# Patient Record
Sex: FEMALE | Race: WHITE | NOT HISPANIC OR LATINO
[De-identification: names, ages, dates, MRNs, and addresses within clinical notes are randomized per-mention and may not be internally consistent; named-entity substitution may affect disease eponyms.]

---

## 2018-11-27 PROBLEM — Z00.00 ENCOUNTER FOR PREVENTIVE HEALTH EXAMINATION: Status: ACTIVE | Noted: 2018-11-27

## 2018-12-06 ENCOUNTER — APPOINTMENT (OUTPATIENT)
Dept: OBGYN | Facility: CLINIC | Age: 23
End: 2018-12-06
Payer: COMMERCIAL

## 2018-12-06 PROCEDURE — 99385 PREV VISIT NEW AGE 18-39: CPT

## 2019-03-05 ENCOUNTER — APPOINTMENT (OUTPATIENT)
Dept: OBGYN | Facility: CLINIC | Age: 24
End: 2019-03-05
Payer: COMMERCIAL

## 2019-03-05 PROCEDURE — 99215 OFFICE O/P EST HI 40 MIN: CPT | Mod: 25

## 2019-03-05 PROCEDURE — 76817 TRANSVAGINAL US OBSTETRIC: CPT

## 2019-03-19 ENCOUNTER — APPOINTMENT (OUTPATIENT)
Dept: OBGYN | Facility: CLINIC | Age: 24
End: 2019-03-19
Payer: COMMERCIAL

## 2019-03-19 PROCEDURE — 76801 OB US < 14 WKS SINGLE FETUS: CPT

## 2019-04-04 ENCOUNTER — APPOINTMENT (OUTPATIENT)
Dept: OBGYN | Facility: CLINIC | Age: 24
End: 2019-04-04
Payer: COMMERCIAL

## 2019-04-04 PROCEDURE — 0502F SUBSEQUENT PRENATAL CARE: CPT

## 2019-05-02 ENCOUNTER — APPOINTMENT (OUTPATIENT)
Dept: OBGYN | Facility: CLINIC | Age: 24
End: 2019-05-02
Payer: COMMERCIAL

## 2019-05-02 PROCEDURE — 0502F SUBSEQUENT PRENATAL CARE: CPT

## 2019-06-06 ENCOUNTER — APPOINTMENT (OUTPATIENT)
Dept: OBGYN | Facility: CLINIC | Age: 24
End: 2019-06-06
Payer: COMMERCIAL

## 2019-06-06 PROCEDURE — 0502F SUBSEQUENT PRENATAL CARE: CPT

## 2019-06-21 ENCOUNTER — OUTPATIENT (OUTPATIENT)
Dept: OUTPATIENT SERVICES | Facility: HOSPITAL | Age: 24
LOS: 1 days | Discharge: HOME | End: 2019-06-21

## 2019-06-21 VITALS — DIASTOLIC BLOOD PRESSURE: 61 MMHG | HEART RATE: 88 BPM | SYSTOLIC BLOOD PRESSURE: 108 MMHG | RESPIRATION RATE: 18 BRPM

## 2019-06-21 VITALS — SYSTOLIC BLOOD PRESSURE: 108 MMHG | DIASTOLIC BLOOD PRESSURE: 60 MMHG | HEART RATE: 96 BPM

## 2019-06-21 DIAGNOSIS — R55 SYNCOPE AND COLLAPSE: ICD-10-CM

## 2019-06-21 DIAGNOSIS — O26.892 OTHER SPECIFIED PREGNANCY RELATED CONDITIONS, SECOND TRIMESTER: ICD-10-CM

## 2019-06-21 LAB — GLUCOSE BLDC GLUCOMTR-MCNC: 106 MG/DL — HIGH (ref 70–99)

## 2019-06-21 NOTE — OB PROVIDER TRIAGE NOTE - NSHPPHYSICALEXAM_GEN_ALL_CORE
PHYSICAL EXAM:  -  HR: 88 (06-21 @ 13:21)  BP: 108/61 (06-21 @ 13:21)  RR: 18 (06-21 @ 13:21)  Constitutional: AAOx3, NAD  Abdomen: Soft, gravid, nontender, no palpable ctx  FH ~ 20cms

## 2019-06-21 NOTE — OB PROVIDER TRIAGE NOTE - HISTORY OF PRESENT ILLNESS
25 y/o  @ 21.3 weeks mignon 10/29/19 date by lmp, who presents to l & D [place] in [place] @ 1130 am. PT denies LOC. head trauma or abdominal trauma.  PT states this occurred before and was found to have low sugar.  Pt was recently placed on Z-tk for a cold and did not eat more than a slice of bread at breakfast.  Patient was assessed by EMS told her sugar was low & encouraged to eat a meal.  She feels better now s/p eating & drinking. Pt denies any contractions, lof or vaginal bleeding.  PT states + fetal movement.

## 2019-06-21 NOTE — OB PROVIDER TRIAGE NOTE - NSOBPROVIDERNOTE_OBGYN_ALL_OB_FT
23 y/o  @ 21.3 weeks s/p vasovagal episode earlier, no LOC no obstetrical complaints  encouraged small frequent meals & po hydration  d/c ot home with follow-up  d/w Dr Levine & Dr Oliver  pre term labor precautions given

## 2019-07-08 ENCOUNTER — APPOINTMENT (OUTPATIENT)
Dept: OBGYN | Facility: CLINIC | Age: 24
End: 2019-07-08
Payer: COMMERCIAL

## 2019-07-08 PROCEDURE — 0502F SUBSEQUENT PRENATAL CARE: CPT

## 2019-07-09 PROBLEM — Z78.9 OTHER SPECIFIED HEALTH STATUS: Chronic | Status: ACTIVE | Noted: 2019-06-21

## 2019-08-08 ENCOUNTER — APPOINTMENT (OUTPATIENT)
Dept: OBGYN | Facility: CLINIC | Age: 24
End: 2019-08-08
Payer: COMMERCIAL

## 2019-08-08 PROCEDURE — 0502F SUBSEQUENT PRENATAL CARE: CPT

## 2019-09-05 ENCOUNTER — APPOINTMENT (OUTPATIENT)
Dept: OBGYN | Facility: CLINIC | Age: 24
End: 2019-09-05

## 2019-09-09 ENCOUNTER — APPOINTMENT (OUTPATIENT)
Dept: OBGYN | Facility: CLINIC | Age: 24
End: 2019-09-09
Payer: COMMERCIAL

## 2019-09-09 PROCEDURE — 0502F SUBSEQUENT PRENATAL CARE: CPT

## 2019-09-23 ENCOUNTER — TRANSCRIPTION ENCOUNTER (OUTPATIENT)
Age: 24
End: 2019-09-23

## 2019-09-24 ENCOUNTER — APPOINTMENT (OUTPATIENT)
Dept: OBGYN | Facility: CLINIC | Age: 24
End: 2019-09-24
Payer: COMMERCIAL

## 2019-09-24 PROCEDURE — 0502F SUBSEQUENT PRENATAL CARE: CPT

## 2019-10-07 ENCOUNTER — APPOINTMENT (OUTPATIENT)
Dept: OBGYN | Facility: CLINIC | Age: 24
End: 2019-10-07
Payer: COMMERCIAL

## 2019-10-07 PROCEDURE — 0502F SUBSEQUENT PRENATAL CARE: CPT

## 2019-10-07 PROCEDURE — 59426 ANTEPARTUM CARE ONLY: CPT

## 2019-10-17 ENCOUNTER — APPOINTMENT (OUTPATIENT)
Dept: OBGYN | Facility: CLINIC | Age: 24
End: 2019-10-17
Payer: COMMERCIAL

## 2019-10-17 PROCEDURE — 76816 OB US FOLLOW-UP PER FETUS: CPT

## 2019-10-24 ENCOUNTER — APPOINTMENT (OUTPATIENT)
Dept: OBGYN | Facility: CLINIC | Age: 24
End: 2019-10-24
Payer: COMMERCIAL

## 2019-10-24 PROCEDURE — 0502F SUBSEQUENT PRENATAL CARE: CPT

## 2019-10-27 ENCOUNTER — OUTPATIENT (OUTPATIENT)
Dept: OUTPATIENT SERVICES | Facility: HOSPITAL | Age: 24
LOS: 1 days | Discharge: HOME | End: 2019-10-27
Payer: COMMERCIAL

## 2019-10-27 ENCOUNTER — OUTPATIENT (OUTPATIENT)
Dept: OUTPATIENT SERVICES | Facility: HOSPITAL | Age: 24
LOS: 1 days | Discharge: HOME | End: 2019-10-27

## 2019-10-27 ENCOUNTER — INPATIENT (INPATIENT)
Facility: HOSPITAL | Age: 24
LOS: 2 days | Discharge: HOME | End: 2019-10-30
Attending: OBSTETRICS & GYNECOLOGY | Admitting: OBSTETRICS & GYNECOLOGY
Payer: COMMERCIAL

## 2019-10-27 VITALS
DIASTOLIC BLOOD PRESSURE: 71 MMHG | RESPIRATION RATE: 18 BRPM | HEART RATE: 89 BPM | TEMPERATURE: 98 F | SYSTOLIC BLOOD PRESSURE: 133 MMHG

## 2019-10-27 VITALS
TEMPERATURE: 98 F | SYSTOLIC BLOOD PRESSURE: 122 MMHG | DIASTOLIC BLOOD PRESSURE: 86 MMHG | RESPIRATION RATE: 20 BRPM | HEART RATE: 101 BPM

## 2019-10-27 VITALS
RESPIRATION RATE: 18 BRPM | TEMPERATURE: 98 F | HEART RATE: 110 BPM | DIASTOLIC BLOOD PRESSURE: 86 MMHG | SYSTOLIC BLOOD PRESSURE: 129 MMHG

## 2019-10-27 VITALS — SYSTOLIC BLOOD PRESSURE: 120 MMHG | DIASTOLIC BLOOD PRESSURE: 73 MMHG | HEART RATE: 100 BPM

## 2019-10-27 VITALS — SYSTOLIC BLOOD PRESSURE: 130 MMHG | HEART RATE: 91 BPM | DIASTOLIC BLOOD PRESSURE: 72 MMHG

## 2019-10-27 VITALS — HEART RATE: 93 BPM | SYSTOLIC BLOOD PRESSURE: 109 MMHG | DIASTOLIC BLOOD PRESSURE: 64 MMHG

## 2019-10-27 VITALS — TEMPERATURE: 98 F

## 2019-10-27 LAB
APPEARANCE UR: CLEAR — SIGNIFICANT CHANGE UP
BACTERIA # UR AUTO: ABNORMAL
BASOPHILS # BLD AUTO: 0.03 K/UL — SIGNIFICANT CHANGE UP (ref 0–0.2)
BASOPHILS NFR BLD AUTO: 0.3 % — SIGNIFICANT CHANGE UP (ref 0–1)
BILIRUB UR-MCNC: NEGATIVE — SIGNIFICANT CHANGE UP
BLD GP AB SCN SERPL QL: SIGNIFICANT CHANGE UP
COLOR SPEC: SIGNIFICANT CHANGE UP
DIFF PNL FLD: ABNORMAL
EOSINOPHIL # BLD AUTO: 0.07 K/UL — SIGNIFICANT CHANGE UP (ref 0–0.7)
EOSINOPHIL NFR BLD AUTO: 0.6 % — SIGNIFICANT CHANGE UP (ref 0–8)
EPI CELLS # UR: 9 /HPF — HIGH (ref 0–5)
GLUCOSE UR QL: NEGATIVE — SIGNIFICANT CHANGE UP
HCT VFR BLD CALC: 36.1 % — LOW (ref 37–47)
HGB BLD-MCNC: 12.3 G/DL — SIGNIFICANT CHANGE UP (ref 12–16)
HYALINE CASTS # UR AUTO: 1 /LPF — SIGNIFICANT CHANGE UP (ref 0–7)
IMM GRANULOCYTES NFR BLD AUTO: 0.7 % — HIGH (ref 0.1–0.3)
KETONES UR-MCNC: NEGATIVE — SIGNIFICANT CHANGE UP
LEUKOCYTE ESTERASE UR-ACNC: NEGATIVE — SIGNIFICANT CHANGE UP
LYMPHOCYTES # BLD AUTO: 1.72 K/UL — SIGNIFICANT CHANGE UP (ref 1.2–3.4)
LYMPHOCYTES # BLD AUTO: 14.8 % — LOW (ref 20.5–51.1)
MCHC RBC-ENTMCNC: 28.1 PG — SIGNIFICANT CHANGE UP (ref 27–31)
MCHC RBC-ENTMCNC: 34.1 G/DL — SIGNIFICANT CHANGE UP (ref 32–37)
MCV RBC AUTO: 82.6 FL — SIGNIFICANT CHANGE UP (ref 81–99)
MONOCYTES # BLD AUTO: 0.93 K/UL — HIGH (ref 0.1–0.6)
MONOCYTES NFR BLD AUTO: 8 % — SIGNIFICANT CHANGE UP (ref 1.7–9.3)
NEUTROPHILS # BLD AUTO: 8.79 K/UL — HIGH (ref 1.4–6.5)
NEUTROPHILS NFR BLD AUTO: 75.6 % — HIGH (ref 42.2–75.2)
NITRITE UR-MCNC: NEGATIVE — SIGNIFICANT CHANGE UP
NRBC # BLD: 0 /100 WBCS — SIGNIFICANT CHANGE UP (ref 0–0)
PH UR: 8 — SIGNIFICANT CHANGE UP (ref 5–8)
PLATELET # BLD AUTO: 184 K/UL — SIGNIFICANT CHANGE UP (ref 130–400)
PRENATAL SYPHILIS TEST: SIGNIFICANT CHANGE UP
PROT UR-MCNC: NEGATIVE — SIGNIFICANT CHANGE UP
RBC # BLD: 4.37 M/UL — SIGNIFICANT CHANGE UP (ref 4.2–5.4)
RBC # FLD: 13.8 % — SIGNIFICANT CHANGE UP (ref 11.5–14.5)
RBC CASTS # UR COMP ASSIST: 2 /HPF — SIGNIFICANT CHANGE UP (ref 0–4)
SP GR SPEC: 1.01 — LOW (ref 1.01–1.02)
UROBILINOGEN FLD QL: SIGNIFICANT CHANGE UP
WBC # BLD: 11.62 K/UL — HIGH (ref 4.8–10.8)
WBC # FLD AUTO: 11.62 K/UL — HIGH (ref 4.8–10.8)
WBC UR QL: 2 /HPF — SIGNIFICANT CHANGE UP (ref 0–5)

## 2019-10-27 PROCEDURE — 59025 FETAL NON-STRESS TEST: CPT | Mod: 26

## 2019-10-27 PROCEDURE — 99283 EMERGENCY DEPT VISIT LOW MDM: CPT | Mod: 25

## 2019-10-27 RX ORDER — OXYTOCIN 10 UNIT/ML
333.33 VIAL (ML) INJECTION
Qty: 20 | Refills: 0 | Status: DISCONTINUED | OUTPATIENT
Start: 2019-10-27 | End: 2019-10-28

## 2019-10-27 RX ORDER — OXYTOCIN 10 UNIT/ML
333.33 VIAL (ML) INJECTION
Qty: 20 | Refills: 0 | Status: DISCONTINUED | OUTPATIENT
Start: 2019-10-27 | End: 2019-11-17

## 2019-10-27 RX ORDER — SODIUM CHLORIDE 9 MG/ML
1000 INJECTION, SOLUTION INTRAVENOUS
Refills: 0 | Status: DISCONTINUED | OUTPATIENT
Start: 2019-10-27 | End: 2019-10-28

## 2019-10-27 RX ORDER — SODIUM CHLORIDE 9 MG/ML
1000 INJECTION, SOLUTION INTRAVENOUS
Refills: 0 | Status: DISCONTINUED | OUTPATIENT
Start: 2019-10-27 | End: 2019-11-17

## 2019-10-27 RX ORDER — BUTORPHANOL TARTRATE 2 MG/ML
2 INJECTION, SOLUTION INTRAMUSCULAR; INTRAVENOUS ONCE
Refills: 0 | Status: DISCONTINUED | OUTPATIENT
Start: 2019-10-27 | End: 2019-10-27

## 2019-10-27 RX ORDER — CITRIC ACID/SODIUM CITRATE 300-500 MG
15 SOLUTION, ORAL ORAL EVERY 6 HOURS
Refills: 0 | Status: DISCONTINUED | OUTPATIENT
Start: 2019-10-27 | End: 2019-10-28

## 2019-10-27 RX ORDER — DIPHENHYDRAMINE HCL 50 MG
25 CAPSULE ORAL ONCE
Refills: 0 | Status: COMPLETED | OUTPATIENT
Start: 2019-10-27 | End: 2019-10-27

## 2019-10-27 RX ADMIN — Medication 101 MILLIGRAM(S): at 14:30

## 2019-10-27 RX ADMIN — BUTORPHANOL TARTRATE 2 MILLIGRAM(S): 2 INJECTION, SOLUTION INTRAMUSCULAR; INTRAVENOUS at 14:30

## 2019-10-27 NOTE — OB PROVIDER H&P - NSHPPHYSICALEXAM_GEN_ALL_CORE
Vital Signs Last 24 Hrs  T(F): 98.7 (27 Oct 2019 22:36), Max: 98.7 (27 Oct 2019 22:36)  HR: 89 (27 Oct 2019 23:02) (77 - 120)  BP: 115/82 (27 Oct 2019 22:51) (95/53 - 133/71)  RR: 18 (27 Oct 2019 22:36) (18 - 20)    Physical Exam:  GA: AOX3, moderate apparent distress from contractions   Resp: CTAB  Cardio: RRR  Abd: soft, nontender, palpable ctx, gravid  Extr: no erythema or pain to palpation bilaterally   SVE: 3/90/-1, vtx, intact     EFM: 135bpm/moderate variability/+accels  [place]: q2mins

## 2019-10-27 NOTE — OB PROVIDER TRIAGE NOTE - NSOBPROVIDERNOTE_OBGYN_ALL_OB_FT
23yo , at 39w5d, GBS negative, in early labor    - Therapeutic rest with Stadol and Benadryl  - Admission labs  - Cont toco and efm  - IV fluids  - Clear liquid diet  - Bedrest with bathroom privileges    Dr. Levine aware

## 2019-10-27 NOTE — OB RN TRIAGE NOTE - NS_TRIAGEMEDICALSCREENINGPERFORMEDBY_OBGYN_ALL_OB_FT
The history, relevant review of systems, past medical and surgical history, medical decision making, and physical examination was documented by the scribe in my presence and I attest to the accuracy of the documentation.
lo

## 2019-10-27 NOTE — OB PROVIDER TRIAGE NOTE - NSHPLABSRESULTS_GEN_ALL_CORE
SONO @ 31w1d: EFW 1805g, 54%, cephalic, 3vc, posterior palcenta, mVP 74mm  SONO @ 19w6d: 324g, transverse, normal anatomy 3vc  SONO @ 11w6d: MAY 10/29/19 FHR +  @8w0d FHR 167bpm, MAY 10/29/19    3/5/19  Hbsag NR  RPR NR  Rubella immune  B Pos   Antibody negative   HIV NR      9/26/19  GBS negative

## 2019-10-27 NOTE — OB PROVIDER TRIAGE NOTE - NSHPPHYSICALEXAM_GEN_ALL_CORE
Vital Signs Last 24 Hrs  T(C): 36.9 (27 Oct 2019 10:22), Max: 36.9 (27 Oct 2019 10:18)  T(F): 98.5 (27 Oct 2019 10:22), Max: 98.5 (27 Oct 2019 10:18)  HR: 100 (27 Oct 2019 10:22) (100 - 101)  BP: 120/73 (27 Oct 2019 10:22) (120/73 - 122/86)  RR: 18 (27 Oct 2019 10:22) (18 - 20)  Gen: in pain with contractions, A&Ox3  Abd: Gravid, soft, NT, palpable ctx  VE: 1-2/90/-1, vtx, intact  EFM: 150/mod/+accels  [place]: Q3mins

## 2019-10-27 NOTE — OB PROVIDER TRIAGE NOTE - NSOBPROVIDERNOTE_OBGYN_ALL_OB_FT
23yo , at 39w5d, GBS negative, in early labor.   -f/u with Dr. Levine this week  -labor precautions given  -[facility] encouraged  -Third trimester HIV when in labor     Dr. Campbell aware, Dr. Salmon aware

## 2019-10-27 NOTE — OB PROVIDER H&P - HISTORY OF PRESENT ILLNESS
23yo  at 39w5d GA dated by LMP with MAY 10/29/19, with contractions since midnight. Pt was seen in L&D twice this morning and once this evening. When she was discharged from triage this evening, she was 2/90/-1, vtx, intact. Pt states that after being discharged from triage, her contractions increased in frequency and intensity at 2200. Pt states that her contractions are now occurring every 2 mins and rates her pain 10/10 intensity. Pt reports good fetal movement. Denies VB, LOF. No complications this pregnancy.  GBS negative. Pt denies HA, vision changes, SOB, cough, N/V/D, epigastric or RUQ pain, erythema or pain in lower extremities.

## 2019-10-27 NOTE — OB PROVIDER TRIAGE NOTE - HISTORY OF PRESENT ILLNESS
25yo , at 39w5d, dated by LMP with MAY 10/29/19, with contractions since midnight worsened at 0500. She was seen in L&D earlier this morning and was 2/-2. Now ctx are painful, q3-4mins. Denies VB, LOF. Good FM. No complications this pregnancy.

## 2019-10-27 NOTE — PROCEDURE NOTE - NSLOADDOSE_OBGYN_ALL_OB
100 Micrograms Fentanyl/Continuous Bupivicaine 0.1 percent/infusion with fentanyl 2mcg /cc @ 15CC/HR/10 ML of 0.25 percent Bupivicaine

## 2019-10-27 NOTE — OB PROVIDER TRIAGE NOTE - NSHPPHYSICALEXAM_GEN_ALL_CORE
Vital Signs Last 24 Hrs  T(C): 36.6 (27 Oct 2019 02:05), Max: 36.7 (27 Oct 2019 02:00)  T(F): 97.9 (27 Oct 2019 02:05), Max: 98.1 (27 Oct 2019 02:00)  HR: 101 (27 Oct 2019 02:05) (101 - 101)  BP: 122/86 (27 Oct 2019 02:05) (122/86 - 122/86)  RR: 20 (27 Oct 2019 02:05) (20 - 20)    Gen: A+OX3. NAD  Abd: Soft nontender. Palpable contractions.   FHR 140bpm/mod alaina/accels+  TOCO q2-4min  SVE 2/80/-3, vertex, intact

## 2019-10-27 NOTE — OB PROVIDER TRIAGE NOTE - NSOBPROVIDERNOTE_OBGYN_ALL_OB_FT
23yo , at 39w5d, GBS negative, in early labor    Discharge home  Labor Precautions given  Oral hydration encouraged  Tylenol PRN for pain  FKC instructions  F/u with OB at next scheduled appt

## 2019-10-27 NOTE — OB PROVIDER H&P - NS_OBGYNHISTORY_OBGYN_ALL_OB_FT
OB Hx: ETOPX1 with no D&C   Gyn: Denies history of abnormal papsmears, STIs, uterine fibroids or ovarian cysts

## 2019-10-27 NOTE — OB PROVIDER H&P - NSHPLABSRESULTS_GEN_ALL_CORE
[place]:  @8w0d FHR 167bpm, MAY 10/29/19  @ 11w6d: MAY 10/29/19 FHR +  @ 19w6d: 324g, transverse, normal anatomy 3vc  @ 31w1d: EFW 1805g, 54%, cephalic, 3vc, posterior placenta, mVP 74mm    Labs:  NIPs: neg   sequential screen 2: neg    3/5/19  Hbsag NR  RPR NR  Rubella immune  B Pos   Antibody negative   HIV NR      9/26/19  GBS negative

## 2019-10-27 NOTE — OB PROVIDER H&P - ASSESSMENT
25yo  at 39w5d GA, Rh positive, GBS negative, with contractions in labor  -admit to labor and delivery  -pain management prn: anesthesia aware of pt's request for an epidural   -continous efm & toco  -admission labs, HbSAg, rubella, HIV   -IV hydration   -diet: CLD    Dr. Oliver and Dr. Levine aware

## 2019-10-27 NOTE — OB PROVIDER TRIAGE NOTE - HISTORY OF PRESENT ILLNESS
23yo , at 39w5d, dated by LMP with MAY 10/29/19, with contractions since midnight worsened at 0500. She was seen in L&D earlier this morning x 2 and was 2/-2 x 2. Now ctx are more painful, q3-4mins, and she desires pain mgt. Denies VB, LOF. Good FM. No complications this pregnancy.  GBS negative.

## 2019-10-27 NOTE — OB PROVIDER TRIAGE NOTE - NSHPPHYSICALEXAM_GEN_ALL_CORE
Vital Signs Last 24 Hrs  T(C): 36.8 (27 Oct 2019 14:07), Max: 36.9 (27 Oct 2019 10:18)  T(F): 98.2 (27 Oct 2019 14:07), Max: 98.5 (27 Oct 2019 10:18)  HR: 86 (27 Oct 2019 15:57) (78 - 102)  BP: 126/63 (27 Oct 2019 14:41) (109/64 - 133/71)  RR: 18 (27 Oct 2019 14:07) (18 - 20)  SpO2: 98% (27 Oct 2019 15:57) (83% - 99%)    Gen: in pain with contractions, A&Ox3  Abd: Gravid, soft, NT, palpable ctx  VE: 2/90/-1, vtx, intact  EFM: 130/mod/+accels  [place]: Q3mins

## 2019-10-28 DIAGNOSIS — Z02.9 ENCOUNTER FOR ADMINISTRATIVE EXAMINATIONS, UNSPECIFIED: ICD-10-CM

## 2019-10-28 LAB
AMPHET UR-MCNC: NEGATIVE — SIGNIFICANT CHANGE UP
BARBITURATES UR SCN-MCNC: NEGATIVE — SIGNIFICANT CHANGE UP
BASOPHILS # BLD AUTO: 0.02 K/UL — SIGNIFICANT CHANGE UP (ref 0–0.2)
BASOPHILS NFR BLD AUTO: 0.1 % — SIGNIFICANT CHANGE UP (ref 0–1)
BENZODIAZ UR-MCNC: NEGATIVE — SIGNIFICANT CHANGE UP
BUPRENORPHINE SCREEN, URINE RESULT: NEGATIVE — SIGNIFICANT CHANGE UP
COCAINE METAB.OTHER UR-MCNC: NEGATIVE — SIGNIFICANT CHANGE UP
EOSINOPHIL # BLD AUTO: 0.03 K/UL — SIGNIFICANT CHANGE UP (ref 0–0.7)
EOSINOPHIL NFR BLD AUTO: 0.2 % — SIGNIFICANT CHANGE UP (ref 0–8)
FENTANYL UR QL: NEGATIVE — SIGNIFICANT CHANGE UP
HBV SURFACE AG SERPL QL IA: SIGNIFICANT CHANGE UP
HCT VFR BLD CALC: 36 % — LOW (ref 37–47)
HGB BLD-MCNC: 12.1 G/DL — SIGNIFICANT CHANGE UP (ref 12–16)
HIV 1+2 AB+HIV1 P24 AG SERPL QL IA: SIGNIFICANT CHANGE UP
IMM GRANULOCYTES NFR BLD AUTO: 0.6 % — HIGH (ref 0.1–0.3)
L&D DRUG SCREEN, URINE: SIGNIFICANT CHANGE UP
LYMPHOCYTES # BLD AUTO: 1.95 K/UL — SIGNIFICANT CHANGE UP (ref 1.2–3.4)
LYMPHOCYTES # BLD AUTO: 14.3 % — LOW (ref 20.5–51.1)
MCHC RBC-ENTMCNC: 28 PG — SIGNIFICANT CHANGE UP (ref 27–31)
MCHC RBC-ENTMCNC: 33.6 G/DL — SIGNIFICANT CHANGE UP (ref 32–37)
MCV RBC AUTO: 83.3 FL — SIGNIFICANT CHANGE UP (ref 81–99)
METHADONE UR-MCNC: NEGATIVE — SIGNIFICANT CHANGE UP
MONOCYTES # BLD AUTO: 1.05 K/UL — HIGH (ref 0.1–0.6)
MONOCYTES NFR BLD AUTO: 7.7 % — SIGNIFICANT CHANGE UP (ref 1.7–9.3)
NEUTROPHILS # BLD AUTO: 10.51 K/UL — HIGH (ref 1.4–6.5)
NEUTROPHILS NFR BLD AUTO: 77.1 % — HIGH (ref 42.2–75.2)
NRBC # BLD: 0 /100 WBCS — SIGNIFICANT CHANGE UP (ref 0–0)
OPIATES UR-MCNC: NEGATIVE — SIGNIFICANT CHANGE UP
OXYCODONE UR-MCNC: NEGATIVE — SIGNIFICANT CHANGE UP
PCP UR-MCNC: NEGATIVE — SIGNIFICANT CHANGE UP
PLATELET # BLD AUTO: 192 K/UL — SIGNIFICANT CHANGE UP (ref 130–400)
PRENATAL SYPHILIS TEST: SIGNIFICANT CHANGE UP
PROPOXYPHENE QUALITATIVE URINE RESULT: NEGATIVE — SIGNIFICANT CHANGE UP
RBC # BLD: 4.32 M/UL — SIGNIFICANT CHANGE UP (ref 4.2–5.4)
RBC # FLD: 13.7 % — SIGNIFICANT CHANGE UP (ref 11.5–14.5)
WBC # BLD: 13.64 K/UL — HIGH (ref 4.8–10.8)
WBC # FLD AUTO: 13.64 K/UL — HIGH (ref 4.8–10.8)

## 2019-10-28 PROCEDURE — 59410 OBSTETRICAL CARE: CPT

## 2019-10-28 RX ORDER — MAGNESIUM HYDROXIDE 400 MG/1
30 TABLET, CHEWABLE ORAL
Refills: 0 | Status: DISCONTINUED | OUTPATIENT
Start: 2019-10-28 | End: 2019-10-30

## 2019-10-28 RX ORDER — AER TRAVELER 0.5 G/1
1 SOLUTION RECTAL; TOPICAL EVERY 4 HOURS
Refills: 0 | Status: DISCONTINUED | OUTPATIENT
Start: 2019-10-28 | End: 2019-10-30

## 2019-10-28 RX ORDER — IBUPROFEN 200 MG
600 TABLET ORAL EVERY 6 HOURS
Refills: 0 | Status: DISCONTINUED | OUTPATIENT
Start: 2019-10-28 | End: 2019-10-30

## 2019-10-28 RX ORDER — SODIUM CHLORIDE 9 MG/ML
3 INJECTION INTRAMUSCULAR; INTRAVENOUS; SUBCUTANEOUS EVERY 8 HOURS
Refills: 0 | Status: DISCONTINUED | OUTPATIENT
Start: 2019-10-28 | End: 2019-10-30

## 2019-10-28 RX ORDER — ACETAMINOPHEN 500 MG
975 TABLET ORAL
Refills: 0 | Status: DISCONTINUED | OUTPATIENT
Start: 2019-10-28 | End: 2019-10-30

## 2019-10-28 RX ORDER — OXYTOCIN 10 UNIT/ML
125 VIAL (ML) INJECTION
Qty: 20 | Refills: 0 | Status: DISCONTINUED | OUTPATIENT
Start: 2019-10-28 | End: 2019-10-30

## 2019-10-28 RX ORDER — KETOROLAC TROMETHAMINE 30 MG/ML
30 SYRINGE (ML) INJECTION ONCE
Refills: 0 | Status: DISCONTINUED | OUTPATIENT
Start: 2019-10-28 | End: 2019-10-28

## 2019-10-28 RX ORDER — OXYTOCIN 10 UNIT/ML
2 VIAL (ML) INJECTION
Qty: 30 | Refills: 0 | Status: DISCONTINUED | OUTPATIENT
Start: 2019-10-28 | End: 2019-10-28

## 2019-10-28 RX ORDER — LANOLIN
1 OINTMENT (GRAM) TOPICAL EVERY 6 HOURS
Refills: 0 | Status: DISCONTINUED | OUTPATIENT
Start: 2019-10-28 | End: 2019-10-30

## 2019-10-28 RX ORDER — DIBUCAINE 1 %
1 OINTMENT (GRAM) RECTAL EVERY 6 HOURS
Refills: 0 | Status: DISCONTINUED | OUTPATIENT
Start: 2019-10-28 | End: 2019-10-30

## 2019-10-28 RX ORDER — DIPHENHYDRAMINE HCL 50 MG
25 CAPSULE ORAL EVERY 6 HOURS
Refills: 0 | Status: DISCONTINUED | OUTPATIENT
Start: 2019-10-28 | End: 2019-10-30

## 2019-10-28 RX ORDER — BENZOCAINE 10 %
1 GEL (GRAM) MUCOUS MEMBRANE EVERY 6 HOURS
Refills: 0 | Status: DISCONTINUED | OUTPATIENT
Start: 2019-10-28 | End: 2019-10-30

## 2019-10-28 RX ORDER — SIMETHICONE 80 MG/1
80 TABLET, CHEWABLE ORAL EVERY 4 HOURS
Refills: 0 | Status: DISCONTINUED | OUTPATIENT
Start: 2019-10-28 | End: 2019-10-30

## 2019-10-28 RX ORDER — OXYCODONE HYDROCHLORIDE 5 MG/1
5 TABLET ORAL ONCE
Refills: 0 | Status: DISCONTINUED | OUTPATIENT
Start: 2019-10-28 | End: 2019-10-30

## 2019-10-28 RX ORDER — IBUPROFEN 200 MG
600 TABLET ORAL EVERY 6 HOURS
Refills: 0 | Status: COMPLETED | OUTPATIENT
Start: 2019-10-28 | End: 2020-09-25

## 2019-10-28 RX ORDER — OXYCODONE HYDROCHLORIDE 5 MG/1
5 TABLET ORAL
Refills: 0 | Status: DISCONTINUED | OUTPATIENT
Start: 2019-10-28 | End: 2019-10-30

## 2019-10-28 RX ADMIN — Medication 2 MILLIUNIT(S)/MIN: at 02:28

## 2019-10-28 RX ADMIN — SODIUM CHLORIDE 3 MILLILITER(S): 9 INJECTION INTRAMUSCULAR; INTRAVENOUS; SUBCUTANEOUS at 21:00

## 2019-10-28 RX ADMIN — Medication 975 MILLIGRAM(S): at 21:04

## 2019-10-28 RX ADMIN — Medication 600 MILLIGRAM(S): at 12:19

## 2019-10-28 RX ADMIN — SODIUM CHLORIDE 3 MILLILITER(S): 9 INJECTION INTRAMUSCULAR; INTRAVENOUS; SUBCUTANEOUS at 15:00

## 2019-10-28 RX ADMIN — Medication 600 MILLIGRAM(S): at 17:27

## 2019-10-28 RX ADMIN — Medication 30 MILLIGRAM(S): at 06:57

## 2019-10-28 RX ADMIN — Medication 975 MILLIGRAM(S): at 15:33

## 2019-10-28 RX ADMIN — Medication 1 TABLET(S): at 12:19

## 2019-10-28 NOTE — PROGRESS NOTE ADULT - SUBJECTIVE AND OBJECTIVE BOX
PGY1 Note    Patient seen at bedside. No complaints at the moment.    T(F): 98.7 (10-27 @ 22:36), Max: 98.7 (10-27 @ 22:36)  HR: 114 (10-28 @ 03:44)  BP: 106/61 (10-28 @ 03:39) (95/53 - 133/71)  RR: 18 (10-27 @ 22:36)    EFM: 145bpm/moderate variability/+accels  TOCO: q2-4mins  SVE: 9.5/100/0, vtx, ruptured per Dr. Levine     Medications:  ([place]): 1 (10-27 @ 23:46)  oxytocin Infusion: 2 (10-28 @ 02:17)      Labs:                        12.1   13.64 )-----------( 192      ( 27 Oct 2019 23:11 )             36.0           Prenatal Syphilis Test: Nonreact (10-27 @ 23:11)  Prenatal Syphilis Test: Nonreact (10-27 @ 14:40)  Antibody Screen: NEG (10-27-19 @ 14:40)    Urinalysis Basic - ( 27 Oct 2019 14:40 )    Color: Light Yellow / Appearance: Clear / S.007 / pH: x  Gluc: x / Ketone: Negative  / Bili: Negative / Urobili: <2 mg/dL   Blood: x / Protein: Negative / Nitrite: Negative   Leuk Esterase: Negative / RBC: 2 /HPF / WBC 2 /HPF   Sq Epi: x / Non Sq Epi: 9 /HPF / Bacteria: Few

## 2019-10-28 NOTE — PROGRESS NOTE ADULT - ASSESSMENT
A/P: 24y  at 39w5d GA, GBS negative, s/p epidural, on pitocin, in labor progressing well.  -Continue current management   -Pain management prn  -Continuous EFM/toco  -F/u pending labs  -Reevaluate     Dr. Oliver and Dr. Levine aware.

## 2019-10-29 ENCOUNTER — TRANSCRIPTION ENCOUNTER (OUTPATIENT)
Age: 24
End: 2019-10-29

## 2019-10-29 LAB
BASOPHILS # BLD AUTO: 0.04 K/UL — SIGNIFICANT CHANGE UP (ref 0–0.2)
BASOPHILS NFR BLD AUTO: 0.4 % — SIGNIFICANT CHANGE UP (ref 0–1)
EOSINOPHIL # BLD AUTO: 0.18 K/UL — SIGNIFICANT CHANGE UP (ref 0–0.7)
EOSINOPHIL NFR BLD AUTO: 2 % — SIGNIFICANT CHANGE UP (ref 0–8)
HCT VFR BLD CALC: 25.5 % — LOW (ref 37–47)
HGB BLD-MCNC: 8.4 G/DL — LOW (ref 12–16)
IMM GRANULOCYTES NFR BLD AUTO: 1 % — HIGH (ref 0.1–0.3)
LYMPHOCYTES # BLD AUTO: 2.27 K/UL — SIGNIFICANT CHANGE UP (ref 1.2–3.4)
LYMPHOCYTES # BLD AUTO: 24.8 % — SIGNIFICANT CHANGE UP (ref 20.5–51.1)
MCHC RBC-ENTMCNC: 27.9 PG — SIGNIFICANT CHANGE UP (ref 27–31)
MCHC RBC-ENTMCNC: 32.9 G/DL — SIGNIFICANT CHANGE UP (ref 32–37)
MCV RBC AUTO: 84.7 FL — SIGNIFICANT CHANGE UP (ref 81–99)
MONOCYTES # BLD AUTO: 0.83 K/UL — HIGH (ref 0.1–0.6)
MONOCYTES NFR BLD AUTO: 9.1 % — SIGNIFICANT CHANGE UP (ref 1.7–9.3)
NEUTROPHILS # BLD AUTO: 5.76 K/UL — SIGNIFICANT CHANGE UP (ref 1.4–6.5)
NEUTROPHILS NFR BLD AUTO: 62.7 % — SIGNIFICANT CHANGE UP (ref 42.2–75.2)
NRBC # BLD: 0 /100 WBCS — SIGNIFICANT CHANGE UP (ref 0–0)
PLATELET # BLD AUTO: 134 K/UL — SIGNIFICANT CHANGE UP (ref 130–400)
RBC # BLD: 3.01 M/UL — LOW (ref 4.2–5.4)
RBC # FLD: 14.5 % — SIGNIFICANT CHANGE UP (ref 11.5–14.5)
RUBV IGG SER-ACNC: 1 INDEX — SIGNIFICANT CHANGE UP
RUBV IGG SER-IMP: SIGNIFICANT CHANGE UP
WBC # BLD: 9.17 K/UL — SIGNIFICANT CHANGE UP (ref 4.8–10.8)
WBC # FLD AUTO: 9.17 K/UL — SIGNIFICANT CHANGE UP (ref 4.8–10.8)

## 2019-10-29 RX ORDER — IBUPROFEN 200 MG
1 TABLET ORAL
Qty: 0 | Refills: 0 | DISCHARGE
Start: 2019-10-29

## 2019-10-29 RX ORDER — ACETAMINOPHEN 500 MG
3 TABLET ORAL
Qty: 0 | Refills: 0 | DISCHARGE
Start: 2019-10-29

## 2019-10-29 RX ORDER — BENZOCAINE 10 %
1 GEL (GRAM) MUCOUS MEMBRANE
Qty: 0 | Refills: 0 | DISCHARGE
Start: 2019-10-29

## 2019-10-29 RX ADMIN — Medication 600 MILLIGRAM(S): at 17:37

## 2019-10-29 RX ADMIN — Medication 600 MILLIGRAM(S): at 06:17

## 2019-10-29 RX ADMIN — AER TRAVELER 1 APPLICATION(S): 0.5 SOLUTION RECTAL; TOPICAL at 00:18

## 2019-10-29 RX ADMIN — Medication 975 MILLIGRAM(S): at 03:40

## 2019-10-29 RX ADMIN — Medication 975 MILLIGRAM(S): at 15:17

## 2019-10-29 RX ADMIN — Medication 600 MILLIGRAM(S): at 00:04

## 2019-10-29 RX ADMIN — Medication 1 TABLET(S): at 11:25

## 2019-10-29 RX ADMIN — Medication 975 MILLIGRAM(S): at 09:49

## 2019-10-29 RX ADMIN — Medication 0.5 MILLILITER(S): at 15:57

## 2019-10-29 RX ADMIN — Medication 600 MILLIGRAM(S): at 17:38

## 2019-10-29 RX ADMIN — Medication 1 APPLICATION(S): at 00:17

## 2019-10-29 RX ADMIN — SODIUM CHLORIDE 3 MILLILITER(S): 9 INJECTION INTRAMUSCULAR; INTRAVENOUS; SUBCUTANEOUS at 14:25

## 2019-10-29 RX ADMIN — Medication 975 MILLIGRAM(S): at 20:56

## 2019-10-29 RX ADMIN — Medication 600 MILLIGRAM(S): at 11:25

## 2019-10-29 RX ADMIN — SODIUM CHLORIDE 3 MILLILITER(S): 9 INJECTION INTRAMUSCULAR; INTRAVENOUS; SUBCUTANEOUS at 06:13

## 2019-10-29 RX ADMIN — Medication 1 SPRAY(S): at 00:17

## 2019-10-29 NOTE — DISCHARGE NOTE OB - CARE PROVIDER_API CALL
Anthony Levine)  Obstetrics and Gynecology  [address] [place] [address]  Phone: [phone]  Fax: [phone]  Follow Up Time:

## 2019-10-29 NOTE — PROGRESS NOTE ADULT - ASSESSMENT
A/P: 23yo  s/p  PPD#1, rubella equivocal, doing well.    - Continue routine postpartum care  - Pain management prn  - Encourage ambulation, oral hydration  - Monitor vitals and bleeding  - MMR prior to discharge    Dr. Leos and Dr. Levine to be made aware

## 2019-10-29 NOTE — DISCHARGE NOTE OB - MATERIALS PROVIDED
MMR Vaccination (VIS Pub Date: 2012)/Vaccinations/[facility]  Screening Program/Bottle Feeding Log/[facility] Hearing Screen Program/Shaken Baby Prevention Handout/[place]  Immunization Record/Breastfeeding Log/Back To Sleep Handout/Birth Certificate Instructions/Breastfeeding Guide and Packet/Breastfeeding Mother’s Support Group Information/Guide to Postpartum Care

## 2019-10-29 NOTE — PROGRESS NOTE ADULT - SUBJECTIVE AND OBJECTIVE BOX
pt doing well  afeb vss  abdomen soft nt nd  fundus firm  lochia wnl  ext -cce    a;p;doing well  dc home in am

## 2019-10-29 NOTE — DISCHARGE NOTE OB - PATIENT PORTAL LINK FT
You can access the FollowMyHealth Patient Portal offered by [facility] by registering at the following website: [email or web address]. By joining [facility] FollowMyHealth portal, you will also be able to view your health information using other applications (apps) compatible with our system.

## 2019-10-29 NOTE — PROGRESS NOTE ADULT - SUBJECTIVE AND OBJECTIVE BOX
PGY 1 Note:    Pt doing well, pain well controlled. Denies headaches, dizziness, SOB, chest pain, palpitations, severe abdominal pain, or heavy vaginal bleeding. No overnight events, no acute complaints. Ambulating without difficulty, voiding, and tolerating regular diet. Breast and bottle feeding.    PAST MEDICAL & SURGICAL HISTORY:  No pertinent past medical history  No significant past surgical history      Physical Exam  Vital Signs Last 24 Hrs  T(F): 97.3 (28 Oct 2019 23:49), Max: 99 (28 Oct 2019 06:34)  HR: 93 (28 Oct 2019 23:49) (89 - 137)  BP: 112/67 (28 Oct 2019 23:49) (98/57 - 117/77)  RR: 18 (28 Oct 2019 23:49) (16 - 18)    Gen: AAOx3, NAD  CVS: RRR  Lungs: CTABL  Abd: Soft, nontender, nondistended  Fundus: Firm, nontender, below the umbilicus  Lochia: Minimal  Ext: No calf tenderness, no edema bilaterally    Labs:                        12.1   13.64 )-----------( 192      ( 27 Oct 2019 23:11 )             36.0                         12.3   11.62 )-----------( 184      ( 27 Oct 2019 14:40 )             36.1

## 2019-10-29 NOTE — DISCHARGE NOTE OB - CARE PLAN
Principal Discharge DX:	Vaginal delivery  Goal:	healthy patient  Assessment and plan of treatment:	monitor vitals and labs

## 2019-10-30 VITALS
RESPIRATION RATE: 18 BRPM | SYSTOLIC BLOOD PRESSURE: 111 MMHG | DIASTOLIC BLOOD PRESSURE: 68 MMHG | TEMPERATURE: 97 F | HEART RATE: 102 BPM

## 2019-10-30 RX ADMIN — Medication 600 MILLIGRAM(S): at 06:12

## 2019-10-30 RX ADMIN — Medication 600 MILLIGRAM(S): at 12:36

## 2019-10-30 NOTE — PROGRESS NOTE ADULT - SUBJECTIVE AND OBJECTIVE BOX
PGY 1 Note:    Pt doing well, pain well controlled. Denies dizziness, lightheadedness, chest pain, SOB, palpitations, or heavy vaginal bleeding. No overnight events, no acute complaints. Ambulating without difficulty, voiding, and tolerating regular diet. Breastfeeding.    PAST MEDICAL & SURGICAL HISTORY:  No pertinent past medical history  No significant past surgical history      Physical Exam  Vital Signs Last 24 Hrs  T(F): 98.3 (29 Oct 2019 23:35), Max: 98.3 (29 Oct 2019 23:35)  HR: 101 (29 Oct 2019 23:35) (81 - 101)  BP: 119/68 (29 Oct 2019 23:35) (102/60 - 119/68)  RR: 18 (29 Oct 2019 23:35) (18 - 18)    Gen: AAOx3, NAD  CVS: RRR  Lungs: CTABL  Abd: Soft, nontender, nondistended  Fundus: Firm, nontender, below the umbilicus  Lochia: Minimal  Ext: No calf tenderness, no swelling    Labs:                        8.4    9.17  )-----------( 134      ( 29 Oct 2019 05:35 )             25.5                         12.1   13.64 )-----------( 192      ( 27 Oct 2019 23:11 )             36.0

## 2019-10-30 NOTE — PROGRESS NOTE ADULT - ASSESSMENT
A/P: 23yo  s/p  PPD#2, rubella equivocal, doing well.    - Continue routine postpartum care  - Pain management prn  - Encourage ambulation, oral hydration  - Monitor vitals and bleeding  - Discharge home today    Dr. Leos and Dr. Levine to be made aware

## 2019-10-31 ENCOUNTER — APPOINTMENT (OUTPATIENT)
Dept: OBGYN | Facility: CLINIC | Age: 24
End: 2019-10-31

## 2019-11-02 DIAGNOSIS — Z3A.39 39 WEEKS GESTATION OF PREGNANCY: ICD-10-CM

## 2019-11-02 DIAGNOSIS — Z88.0 ALLERGY STATUS TO PENICILLIN: ICD-10-CM

## 2019-12-03 NOTE — OB PROVIDER TRIAGE NOTE - NS_SONODONE_OBGYN_ALL_OB
Yes muscle strength/gross motor/joint integrity and mobility/integumentary integrity/gait, locomotion, and balance

## 2019-12-09 ENCOUNTER — APPOINTMENT (OUTPATIENT)
Dept: OBGYN | Facility: CLINIC | Age: 24
End: 2019-12-09
Payer: COMMERCIAL

## 2019-12-09 PROCEDURE — 0503F POSTPARTUM CARE VISIT: CPT

## 2021-03-03 NOTE — OB PROVIDER DELIVERY SUMMARY - NS_AFTERADMROM_OBGYN_ALL_OB_DT
Detail Level: Zone
Quality 226: Preventive Care And Screening: Tobacco Use: Screening And Cessation Intervention: Patient screened for tobacco use and is an ex/non-smoker
28-Oct-2019 01:45

## 2022-07-05 NOTE — OB RN TRIAGE NOTE - PT NEEDS ASSIST
Decreased appetite is drinking  Nausea  Sleeping a lot  Generalized aches  No diarrhea  Denies other symptoms currently  Sibling with covid exposure at work  Mom wants covid swab  Will come to [place] today at 1145  To remain home till resulted
Patient has been dizzy and very tired sibling might be covid positive and mom is concern would like patient covid tested as well would like orders placed in to have them tested
no

## 2023-10-09 NOTE — OB PROVIDER H&P - NSNYCREQUIREMENTS_OBGYN_ALL_OB
Left voicemail for patient regarding heart scan results w/assistance of [facility]  #[phone].    Per chart review, patient's PCP's office has also tried reached out regarding heart scan results, including incidental finding noted. Patient has not yet been reached.   [facility]

## 2023-11-21 NOTE — OB PROVIDER H&P - NSPRENATALGBS_OBGYN_ALL_OB_GET_DAYS
female presents with urinary stream.  Vitals normal no fever.  UA positive for UTI.  Pending kidney urinary bladder ultrasound and labs.  Will give 1 dose of antibiotics for cellulitis or pharmacy. 31

## 2024-02-18 NOTE — PROCEDURAL SAFETY CHECKLIST WITH OR WITHOUT SEDATION - NSSIDESITEMARKD_GEN_ALL_CORE
The DP pulses are 1+ bilaterally. The PT pulses are 1+ bilaterally. The radial pulses are 1+ bilaterally. not applicable